# Patient Record
Sex: MALE | Race: WHITE | Employment: UNEMPLOYED | URBAN - METROPOLITAN AREA
[De-identification: names, ages, dates, MRNs, and addresses within clinical notes are randomized per-mention and may not be internally consistent; named-entity substitution may affect disease eponyms.]

---

## 2020-03-09 PROBLEM — B35.1 TINEA UNGUIUM: Status: ACTIVE | Noted: 2020-03-09

## 2020-03-09 PROBLEM — N39.0 URINARY TRACT INFECTIOUS DISEASE: Status: ACTIVE | Noted: 2020-03-09

## 2020-03-09 PROBLEM — R39.15 URINARY URGENCY: Status: ACTIVE | Noted: 2020-03-09

## 2020-03-09 PROBLEM — R58 BLEEDING: Status: ACTIVE | Noted: 2018-08-07

## 2020-03-09 PROBLEM — I10 ESSENTIAL HYPERTENSION: Status: ACTIVE | Noted: 2020-03-09

## 2020-03-09 PROBLEM — D50.9 IRON DEFICIENCY ANEMIA: Status: ACTIVE | Noted: 2020-03-09

## 2020-03-09 PROBLEM — M50.30 DEGENERATION OF CERVICAL INTERVERTEBRAL DISC: Status: ACTIVE | Noted: 2020-03-09

## 2020-03-09 PROBLEM — R97.20 RAISED PROSTATE SPECIFIC ANTIGEN: Status: ACTIVE | Noted: 2020-03-09

## 2020-03-09 PROBLEM — E78.5 HYPERLIPIDEMIA: Status: ACTIVE | Noted: 2020-03-09

## 2020-03-09 PROBLEM — R07.9 CHEST PAIN: Status: ACTIVE | Noted: 2018-07-29

## 2020-03-09 PROBLEM — A63.0 CONDYLOMA ACUMINATUM: Status: ACTIVE | Noted: 2020-03-09

## 2020-03-09 PROBLEM — N20.0 KIDNEY STONE: Status: ACTIVE | Noted: 2018-01-31

## 2020-03-09 PROBLEM — R52 BURNING PAIN: Status: ACTIVE | Noted: 2018-08-07

## 2020-03-09 PROBLEM — R31.29 MICROSCOPIC HEMATURIA: Status: ACTIVE | Noted: 2018-01-31

## 2020-03-09 PROBLEM — R33.9 RETENTION OF URINE: Status: ACTIVE | Noted: 2020-03-09

## 2020-03-09 PROBLEM — R25.1 TREMOR: Status: ACTIVE | Noted: 2020-03-09

## 2020-03-09 PROBLEM — E34.9 TESTOSTERONE DEFICIENCY: Status: ACTIVE | Noted: 2020-03-09

## 2020-03-09 PROBLEM — N40.0 BENIGN PROSTATIC HYPERPLASIA: Status: ACTIVE | Noted: 2020-03-09

## 2020-06-05 NOTE — PERIOP NOTES
PAT - SURGICAL PRE-ADMISSION INSTRUCTIONS    NAME:  Marcus French                                                          TODAY'S DATE:  6/5/2020    SURGERY DATE:  6/11/2020                                  SURGERY ARRIVAL TIME:   0900 TBV    1. Do NOT eat or drink anything, including candy or gum, after MIDNIGHT on 6/10/20 , unless you have specific instructions from your Surgeon or Anesthesia Provider to do so. 2. No smoking on the day of surgery. 3. No alcohol 24 hours prior to the day of surgery. 4. No recreational drugs for one week prior to the day of surgery. 5. Leave all valuables, including money/purse, at home. 6. Remove all jewelry, nail polish, makeup (including mascara); no lotions, powders, deodorant, or perfume/cologne/after shave. 7. Glasses/Contact lenses and Dentures may be worn to the hospital.  They will be removed prior to surgery. 8. Call your doctor if symptoms of a cold or illness develop within 24 ours prior to surgery. 9. AN ADULT MUST DRIVE YOU HOME AFTER OUTPATIENT SURGERY. 10. If you are having an OUTPATIENT procedure, please make arrangements for a responsible adult to be with you for 24 hours after your surgery. 11. If you are admitted to the hospital, you will be assigned to a bed after surgery is complete. Normally a family member will not be able to see you until you are in your assigned bed. 15. Family is encouraged to accompany you to the hospital.  We ask visitors in the treatment area to be limited to ONE person at a time to ensure patient privacy. EXCEPTIONS WILL BE MADE AS NEEDED. 15. Children under 12 are discouraged from entering the treatment area and need to be supervised by an adult when in the waiting room.     Special Instructions:  PATIENT COMING @ 1000 ON 6/8 FOR COVID TEST  Take these medications the morning of surgery with a sip of water:  AMLODIPINE, HYDRALAZINE, TAMBOCOR, PROPRANOLOL, Complete bowel prep per MD instructions., STOP anticoagulants AT LEAST 1 WEEK PRIOR to your surgery or, follow other MD instructions:  Jennifer Berman    Patient Prep:    shower with anti-bacterial soap    These surgical instructions were reviewed with PATIENT during the PAT PHONE CALL. Directions: On the morning of surgery, please go to the MAIN ENTRANCE. Sign in at the Registration Desk.     If you have any questions and/or concerns, please do not hesitate to call:  (Prior to the day of surgery)  [facility] unit:  [phone]  (Day of surgery)  [facility] unit:  [phone]

## 2020-06-08 ENCOUNTER — HOSPITAL ENCOUNTER (OUTPATIENT)
Dept: LAB | Age: 70
Discharge: HOME | End: 2020-06-08
Payer: MEDICARE

## 2020-06-08 DIAGNOSIS — Z01.818 PREOP TESTING: ICD-10-CM

## 2020-06-08 PROCEDURE — 87635 SARS-COV-2 COVID-19 AMP PRB: CPT

## 2020-06-09 LAB — SARS-COV-2, COV2NT: NOT DETECTED

## 2020-06-10 ENCOUNTER — ANESTHESIA EVENT (OUTPATIENT)
Dept: SURGERY | Age: 70
End: 2020-06-10
Payer: MEDICARE

## 2020-06-11 ENCOUNTER — HOSPITAL ENCOUNTER (OUTPATIENT)
Age: 70
Setting detail: OUTPATIENT SURGERY
Discharge: HOME | End: 2020-06-11
Attending: UROLOGY | Admitting: UROLOGY
Payer: MEDICARE

## 2020-06-11 ENCOUNTER — ANESTHESIA (OUTPATIENT)
Dept: SURGERY | Age: 70
End: 2020-06-11
Payer: MEDICARE

## 2020-06-11 VITALS
TEMPERATURE: 98 F | WEIGHT: 258.7 LBS | HEIGHT: 69 IN | DIASTOLIC BLOOD PRESSURE: 81 MMHG | BODY MASS INDEX: 38.32 KG/M2 | HEART RATE: 48 BPM | OXYGEN SATURATION: 98 % | RESPIRATION RATE: 16 BRPM | SYSTOLIC BLOOD PRESSURE: 168 MMHG

## 2020-06-11 DIAGNOSIS — N13.8 BENIGN PROSTATIC HYPERPLASIA WITH URINARY OBSTRUCTION: Primary | ICD-10-CM

## 2020-06-11 DIAGNOSIS — N40.1 BENIGN PROSTATIC HYPERPLASIA WITH URINARY OBSTRUCTION: Primary | ICD-10-CM

## 2020-06-11 PROCEDURE — 77030012884 HC SLV COMPR SCD MTEK -B: Performed by: UROLOGY

## 2020-06-11 PROCEDURE — 76210000017 HC OR PH I REC 1.5 TO 2 HR: Performed by: UROLOGY

## 2020-06-11 PROCEDURE — 76210000026 HC REC RM PH II 1 TO 1.5 HR: Performed by: UROLOGY

## 2020-06-11 PROCEDURE — 76010000172 HC OR TIME 2.5 TO 3 HR INTENSV-TIER 1: Performed by: UROLOGY

## 2020-06-11 PROCEDURE — 76060000036 HC ANESTHESIA 2.5 TO 3 HR: Performed by: UROLOGY

## 2020-06-11 PROCEDURE — 74011000272 HC RX REV CODE- 272: Performed by: UROLOGY

## 2020-06-11 PROCEDURE — 74011250637 HC RX REV CODE- 250/637: Performed by: NURSE ANESTHETIST, CERTIFIED REGISTERED

## 2020-06-11 PROCEDURE — 77030008477 HC STYL SATN SLP COVD -A: Performed by: ANESTHESIOLOGY

## 2020-06-11 PROCEDURE — 77030010547 HC BG URIN W/UMETER -A: Performed by: UROLOGY

## 2020-06-11 PROCEDURE — 77030018836 HC SOL IRR NACL ICUM -A: Performed by: UROLOGY

## 2020-06-11 PROCEDURE — 77030005546 HC CATH URETH FOL 3W BARD -A: Performed by: UROLOGY

## 2020-06-11 PROCEDURE — 74011000250 HC RX REV CODE- 250: Performed by: NURSE ANESTHETIST, CERTIFIED REGISTERED

## 2020-06-11 PROCEDURE — 74011000250 HC RX REV CODE- 250: Performed by: UROLOGY

## 2020-06-11 PROCEDURE — 77030013079 HC BLNKT BAIR HGGR 3M -A: Performed by: ANESTHESIOLOGY

## 2020-06-11 PROCEDURE — 74011250636 HC RX REV CODE- 250/636: Performed by: NURSE ANESTHETIST, CERTIFIED REGISTERED

## 2020-06-11 PROCEDURE — 77030008683 HC TU ET CUF COVD -A: Performed by: ANESTHESIOLOGY

## 2020-06-11 PROCEDURE — 77030010545: Performed by: UROLOGY

## 2020-06-11 PROCEDURE — 77030034696 HC CATH URETH FOL 2W BARD -A: Performed by: UROLOGY

## 2020-06-11 PROCEDURE — 88305 TISSUE EXAM BY PATHOLOGIST: CPT

## 2020-06-11 PROCEDURE — 77030021163 HC TUBE CYSTO IRR ICUM -A: Performed by: UROLOGY

## 2020-06-11 PROCEDURE — 77030018846 HC SOL IRR STRL H20 ICUM -A: Performed by: UROLOGY

## 2020-06-11 PROCEDURE — 74011250636 HC RX REV CODE- 250/636: Performed by: UROLOGY

## 2020-06-11 RX ORDER — GLYCOPYRROLATE 0.2 MG/ML
INJECTION INTRAMUSCULAR; INTRAVENOUS AS NEEDED
Status: DISCONTINUED | OUTPATIENT
Start: 2020-06-11 | End: 2020-06-11 | Stop reason: HOSPADM

## 2020-06-11 RX ORDER — NEOSTIGMINE METHYLSULFATE 1 MG/ML
INJECTION, SOLUTION INTRAVENOUS AS NEEDED
Status: DISCONTINUED | OUTPATIENT
Start: 2020-06-11 | End: 2020-06-11 | Stop reason: HOSPADM

## 2020-06-11 RX ORDER — ONDANSETRON 2 MG/ML
4 INJECTION INTRAMUSCULAR; INTRAVENOUS ONCE
Status: DISCONTINUED | OUTPATIENT
Start: 2020-06-11 | End: 2020-06-11 | Stop reason: HOSPADM

## 2020-06-11 RX ORDER — MIDAZOLAM HYDROCHLORIDE 1 MG/ML
INJECTION, SOLUTION INTRAMUSCULAR; INTRAVENOUS AS NEEDED
Status: DISCONTINUED | OUTPATIENT
Start: 2020-06-11 | End: 2020-06-11 | Stop reason: HOSPADM

## 2020-06-11 RX ORDER — LIDOCAINE HYDROCHLORIDE 10 MG/ML
0.1 INJECTION, SOLUTION EPIDURAL; INFILTRATION; INTRACAUDAL; PERINEURAL AS NEEDED
Status: DISCONTINUED | OUTPATIENT
Start: 2020-06-11 | End: 2020-06-11 | Stop reason: HOSPADM

## 2020-06-11 RX ORDER — FAMOTIDINE 20 MG/1
20 TABLET, FILM COATED ORAL ONCE
Status: COMPLETED | OUTPATIENT
Start: 2020-06-11 | End: 2020-06-11

## 2020-06-11 RX ORDER — OXYCODONE AND ACETAMINOPHEN 5; 325 MG/1; MG/1
1 TABLET ORAL
Qty: 12 TAB | Refills: 0 | Status: SHIPPED | OUTPATIENT
Start: 2020-06-11 | End: 2020-06-14

## 2020-06-11 RX ORDER — ONDANSETRON 2 MG/ML
INJECTION INTRAMUSCULAR; INTRAVENOUS AS NEEDED
Status: DISCONTINUED | OUTPATIENT
Start: 2020-06-11 | End: 2020-06-11 | Stop reason: HOSPADM

## 2020-06-11 RX ORDER — NALOXONE HYDROCHLORIDE 0.4 MG/ML
0.4 INJECTION, SOLUTION INTRAMUSCULAR; INTRAVENOUS; SUBCUTANEOUS AS NEEDED
Status: DISCONTINUED | OUTPATIENT
Start: 2020-06-11 | End: 2020-06-11 | Stop reason: HOSPADM

## 2020-06-11 RX ORDER — FENTANYL CITRATE 50 UG/ML
50 INJECTION, SOLUTION INTRAMUSCULAR; INTRAVENOUS AS NEEDED
Status: DISCONTINUED | OUTPATIENT
Start: 2020-06-11 | End: 2020-06-11 | Stop reason: HOSPADM

## 2020-06-11 RX ORDER — SODIUM CHLORIDE, SODIUM LACTATE, POTASSIUM CHLORIDE, CALCIUM CHLORIDE 600; 310; 30; 20 MG/100ML; MG/100ML; MG/100ML; MG/100ML
75 INJECTION, SOLUTION INTRAVENOUS CONTINUOUS
Status: DISCONTINUED | OUTPATIENT
Start: 2020-06-11 | End: 2020-06-11 | Stop reason: HOSPADM

## 2020-06-11 RX ORDER — VECURONIUM BROMIDE FOR INJECTION 1 MG/ML
INJECTION, POWDER, LYOPHILIZED, FOR SOLUTION INTRAVENOUS AS NEEDED
Status: DISCONTINUED | OUTPATIENT
Start: 2020-06-11 | End: 2020-06-11 | Stop reason: HOSPADM

## 2020-06-11 RX ORDER — DEXAMETHASONE SODIUM PHOSPHATE 4 MG/ML
INJECTION, SOLUTION INTRA-ARTICULAR; INTRALESIONAL; INTRAMUSCULAR; INTRAVENOUS; SOFT TISSUE AS NEEDED
Status: DISCONTINUED | OUTPATIENT
Start: 2020-06-11 | End: 2020-06-11 | Stop reason: HOSPADM

## 2020-06-11 RX ORDER — FENTANYL CITRATE 50 UG/ML
INJECTION, SOLUTION INTRAMUSCULAR; INTRAVENOUS AS NEEDED
Status: DISCONTINUED | OUTPATIENT
Start: 2020-06-11 | End: 2020-06-11 | Stop reason: HOSPADM

## 2020-06-11 RX ORDER — CEFAZOLIN SODIUM 2 G/50ML
2 SOLUTION INTRAVENOUS
Status: COMPLETED | OUTPATIENT
Start: 2020-06-11 | End: 2020-06-11

## 2020-06-11 RX ORDER — DOCUSATE SODIUM 100 MG/1
100 CAPSULE, LIQUID FILLED ORAL 2 TIMES DAILY
Qty: 40 CAP | Refills: 0 | Status: SHIPPED | OUTPATIENT
Start: 2020-06-11 | End: 2020-07-01

## 2020-06-11 RX ORDER — LIDOCAINE HYDROCHLORIDE 20 MG/ML
INJECTION, SOLUTION EPIDURAL; INFILTRATION; INTRACAUDAL; PERINEURAL AS NEEDED
Status: DISCONTINUED | OUTPATIENT
Start: 2020-06-11 | End: 2020-06-11 | Stop reason: HOSPADM

## 2020-06-11 RX ORDER — SUCCINYLCHOLINE CHLORIDE 100 MG/5ML
SYRINGE (ML) INTRAVENOUS AS NEEDED
Status: DISCONTINUED | OUTPATIENT
Start: 2020-06-11 | End: 2020-06-11 | Stop reason: HOSPADM

## 2020-06-11 RX ORDER — PROPOFOL 10 MG/ML
INJECTION, EMULSION INTRAVENOUS AS NEEDED
Status: DISCONTINUED | OUTPATIENT
Start: 2020-06-11 | End: 2020-06-11 | Stop reason: HOSPADM

## 2020-06-11 RX ORDER — SODIUM CHLORIDE, SODIUM LACTATE, POTASSIUM CHLORIDE, CALCIUM CHLORIDE 600; 310; 30; 20 MG/100ML; MG/100ML; MG/100ML; MG/100ML
50 INJECTION, SOLUTION INTRAVENOUS CONTINUOUS
Status: DISCONTINUED | OUTPATIENT
Start: 2020-06-11 | End: 2020-06-11 | Stop reason: HOSPADM

## 2020-06-11 RX ADMIN — MIDAZOLAM 2 MG: 1 INJECTION INTRAMUSCULAR; INTRAVENOUS at 10:01

## 2020-06-11 RX ADMIN — DEXAMETHASONE SODIUM PHOSPHATE 4 MG: 4 INJECTION, SOLUTION INTRA-ARTICULAR; INTRALESIONAL; INTRAMUSCULAR; INTRAVENOUS; SOFT TISSUE at 10:20

## 2020-06-11 RX ADMIN — VECURONIUM BROMIDE FOR INJECTION 1 MG: 1 INJECTION, POWDER, LYOPHILIZED, FOR SOLUTION INTRAVENOUS at 11:07

## 2020-06-11 RX ADMIN — VECURONIUM BROMIDE FOR INJECTION 3 MG: 1 INJECTION, POWDER, LYOPHILIZED, FOR SOLUTION INTRAVENOUS at 10:17

## 2020-06-11 RX ADMIN — SODIUM CHLORIDE, SODIUM LACTATE, POTASSIUM CHLORIDE, AND CALCIUM CHLORIDE 50 ML/HR: 600; 310; 30; 20 INJECTION, SOLUTION INTRAVENOUS at 09:01

## 2020-06-11 RX ADMIN — LIDOCAINE HYDROCHLORIDE 100 MG: 20 INJECTION, SOLUTION INTRAVENOUS at 10:07

## 2020-06-11 RX ADMIN — Medication 100 MG: at 10:07

## 2020-06-11 RX ADMIN — Medication 3 MG: at 12:30

## 2020-06-11 RX ADMIN — VECURONIUM BROMIDE FOR INJECTION 1 MG: 1 INJECTION, POWDER, LYOPHILIZED, FOR SOLUTION INTRAVENOUS at 10:07

## 2020-06-11 RX ADMIN — VECURONIUM BROMIDE FOR INJECTION 1 MG: 1 INJECTION, POWDER, LYOPHILIZED, FOR SOLUTION INTRAVENOUS at 10:45

## 2020-06-11 RX ADMIN — FENTANYL CITRATE 50 MCG: 50 INJECTION, SOLUTION INTRAMUSCULAR; INTRAVENOUS at 13:18

## 2020-06-11 RX ADMIN — VECURONIUM BROMIDE FOR INJECTION 0.5 MG: 1 INJECTION, POWDER, LYOPHILIZED, FOR SOLUTION INTRAVENOUS at 12:03

## 2020-06-11 RX ADMIN — GLYCOPYRROLATE 0.1 MG: 0.2 INJECTION INTRAMUSCULAR; INTRAVENOUS at 10:58

## 2020-06-11 RX ADMIN — VECURONIUM BROMIDE FOR INJECTION 1 MG: 1 INJECTION, POWDER, LYOPHILIZED, FOR SOLUTION INTRAVENOUS at 11:39

## 2020-06-11 RX ADMIN — GLYCOPYRROLATE 0.4 MG: 0.2 INJECTION INTRAMUSCULAR; INTRAVENOUS at 12:30

## 2020-06-11 RX ADMIN — FENTANYL CITRATE 100 MCG: 50 INJECTION, SOLUTION INTRAMUSCULAR; INTRAVENOUS at 10:07

## 2020-06-11 RX ADMIN — FAMOTIDINE 20 MG: 20 TABLET ORAL at 08:48

## 2020-06-11 RX ADMIN — PROPOFOL 200 MG: 10 INJECTION, EMULSION INTRAVENOUS at 10:07

## 2020-06-11 RX ADMIN — CEFAZOLIN 2 G: 10 INJECTION, POWDER, FOR SOLUTION INTRAVENOUS at 10:14

## 2020-06-11 RX ADMIN — ONDANSETRON 4 MG: 2 SOLUTION INTRAMUSCULAR; INTRAVENOUS at 12:25

## 2020-06-11 RX ADMIN — GLYCOPYRROLATE 0.1 MG: 0.2 INJECTION INTRAMUSCULAR; INTRAVENOUS at 10:53

## 2020-06-11 NOTE — ANESTHESIA POSTPROCEDURE EVALUATION
Procedure(s):  protouch laser enucleation of the prostate with tissue morcellation. general    Anesthesia Post Evaluation      Multimodal analgesia: multimodal analgesia not used between 6 hours prior to anesthesia start to PACU discharge  Patient location during evaluation: PACU  Patient participation: complete - patient participated  Level of consciousness: awake and alert  Pain score: 2  Pain management: adequate  Airway patency: patent  Anesthetic complications: no  Cardiovascular status: acceptable and hypertensive  Respiratory status: acceptable and room air  Hydration status: acceptable  Post anesthesia nausea and vomiting:  none  Final Post Anesthesia Temperature Assessment:  Normothermia (36.0-37.5 degrees C)      INITIAL Post-op Vital signs:   Vitals Value Taken Time   /80 6/11/2020  1:35 PM   Temp     Pulse 48 6/11/2020  1:38 PM   Resp 13 6/11/2020  1:38 PM   SpO2 94 % 6/11/2020  1:38 PM   Vitals shown include unvalidated device data.

## 2020-06-11 NOTE — ANESTHESIA PREPROCEDURE EVALUATION
Relevant Problems   No relevant active problems       Anesthetic History   No history of anesthetic complications            Review of Systems / Medical History  Patient summary reviewed, nursing notes reviewed and pertinent labs reviewed    Pulmonary        Sleep apnea: No treatment        Comments: + Hx/o SU, but resolved post bariatric surgery and weight loss in past   Neuro/Psych             Comments: + Benign Essential Tremors Cardiovascular    Hypertension        Dysrhythmias : atrial fibrillation  Hyperlipidemia  Pertinent negatives: CHF:       Comments: S/P Cardioversion for Afib in past   GI/Hepatic/Renal     GERD: well controlled      Liver disease    Comments: + BPH  + Hx/o Increased LFTs Endo/Other        Obesity, morbid obesity, arthritis and anemia     Other Findings   Comments: Medical History    Medical History Date Comments  Degeneration of cervical intervertebral disc      Iron deficiency anemia      Impotence of organic origin      Tear of medial meniscus of knee joint      Sleep apnea   RESOLVED  GERD (gastroesophageal reflux disease)      Adverse effect of anesthesia   ITCHING  Essential hypertension      Hyperlipidemia on statin therapy      Benign Essential Tremor on Mysoline and propranolol      BPH on Flomax   Followed by Zach Hearn   S/P gastric bypass 2000 4/8/2011 2000   Recurrent Paroxysmal atrial fibrillation  8/11/2009 Initial paroxysmal atrial fibrillation 2009 underwent cardioversion, maintained on daily aspirin Off coumadin 8/2010  Osteoarthritis of both knees s/p bilateral total knee replacement      Irritable bowel syndrome      Chest pain      Shortness of breath      Hypercholesteremia      Cardiac arrhythmia 08/2018 a fib (previous cardioversion 2009 successful)  Gastric bypass, h/o 8/24/2015    Edema 9/20/2007    Microscopic Hematuria-NEG  W/U 10/16/2008    Hypovitaminosis D 11/22/2012    Nonspecific abnormal results of liver function study 9/20/2007    Proteinuria 10/16/2008    SU - resolved s/p gastric bypass surgery 2000. 8/24/2015      Active Problems    Problem Noted Date  Severe obesity (BMI 35.0-35.9 with comorbidity) 05/29/2019  Osteoarthrosis left knee, s/p left total knee arthroplasty 8/24/2015, Dr. Gaffney 08/24/2015  S/P Right knee replacement Oct 14, 2013 10/14/2013  Hypovitaminosis D 11/22/2012  S/P gastric bypass 2000 04/08/2011  Overview:     2000    Tinnitus 09/24/2010  Recurrent Paroxysmal atrial fibrillation  08/11/2009  Overview:     Initial paroxysmal atrial fibrillation 2009 underwent cardioversion, maintained on daily aspirin  Off coumadin 8/2010    Impotence of organic origin 09/20/2007  Essential hypertension    Degeneration of cervical intervertebral disc    Iron deficiency anemia    Testosterone deficiency    Hyperlipidemia on statin therapy    BPH on Flomax    Overview:     Followed by Mara Welch   Benign Essential Tremor on Mysoline and propranolol    Tinea unguium      Resolved Problems    Problem Noted Date Resolved Date  Chest pain 07/29/2018 03/11/2020  Hypercoagulable state 08/27/2015 01/11/2018  Gastric bypass, h/o 08/24/2015 12/06/2018  SU - resolved s/p gastric bypass surgery 2000. 08/24/2015 12/06/2018  OA (osteoarthritis) of knee 10/14/2013 01/11/2018  Chronic anticoagulation 04/06/2010 11/02/2012  Overview:     Rec by Miya Reid, Dr Nila Pardo, as patient unaware of heart rhythm.     Proteinuria 10/16/2008 12/06/2018  Microscopic Hematuria-NEG  W/U 10/16/2008 12/06/2018  Edema 09/20/2007 12/06/2018  IRON DEFIC ANEMIA NOS 09/20/2007 11/30/2015  Nonspecific abnormal results of liver function study 09/20/2007 12/06/2018  Tear of medial meniscus of knee joint   01/11/2018           Physical Exam    Airway  Mallampati: II  TM Distance: 4 - 6 cm  Neck ROM: normal range of motion   Mouth opening: Normal     Cardiovascular    Rhythm: regular  Rate: normal         Dental      Comments: +Few missing teeth, but denies loose teeth.   Pulmonary  Breath sounds clear to auscultation               Abdominal  GI exam deferred       Other Findings            Anesthetic Plan    ASA: 3  Anesthesia type: general          Induction: Intravenous  Anesthetic plan and risks discussed with: Patient

## 2020-06-11 NOTE — PERIOP NOTES
Pre-Op Summary    Pt arrived via car with family/friend and is oriented to time, place, person and situation. Patient with steady gait with none assistive devices. Visit Vitals  /83 (BP 1 Location: Left arm, BP Patient Position: Sitting)   Pulse (!) 54   Temp 98 °F (36.7 °C)   Resp 16   Ht 5' 9\" (1.753 m)   Wt 117.3 kg (258 lb 11.2 oz)   SpO2 98%   BMI 38.20 kg/m²       Peripheral IV located on Left hand . Patients belongings are located locked in store room. Patient's point of contact is Segundo Hagen, wife and their contact number is: [phone]. They will be called for ride home. They are able to receive medication information. They will be their ride home.

## 2020-06-11 NOTE — DISCHARGE INSTRUCTIONS
Patient Education        Transurethral Resection of the Prostate (TURP): What to Expect at [facility]     Transurethral resection of the prostate (TURP) is surgery to remove prostate tissue. It is done when an overgrown prostate gland is pressing on the urethra and making it hard for a man to urinate. You may need a urinary catheter for a short time. It is a flexible plastic tube used to drain urine from your bladder when you can't urinate on your own. If it is still in place when you go home, your doctor will give you instructions on how to care for your catheter. For several days after surgery, you may feel burning when you urinate. Your urine may be pink for 1 to 3 weeks after surgery. You also may have bladder cramps, or spasms. Your doctor may give you medicine to help control the spasms. You may still feel like you need to urinate often in the weeks after your surgery. It often takes up to 6 weeks for this to get better. After you have healed, you may have less trouble urinating. You may have better control over starting and stopping your urine stream. And you may feel like you get more relief when you urinate. Most men can return to work or many of their usual tasks in 1 to 3 weeks. But for about 6 weeks, try to avoid heavy lifting and strenuous activities that might put extra pressure on your bladder. Most men still can have erections after surgery (if they were able to have them before surgery). But they may not ejaculate when they have an orgasm. Semen may go into the bladder instead of out through the penis. This is called retrograde ejaculation. It does not hurt and is not harmful to your health. This care sheet gives you a general idea about how long it will take for you to recover. But each person recovers at a different pace. Follow the steps below to get better as quickly as possible. How can you care for yourself at home? Activity  · Rest when you feel tired. · Be active.  Walking is a good choice. · Allow your body to heal. Don't move quickly or lift anything heavy until you are feeling better. · Ask your doctor when you can drive again. · Many people are able to return to work within 1 to 3 weeks after surgery. It depends on the type of work you do and how you feel. · Do not put anything in your rectum, such as an enema or suppository, for 4 to 6 weeks after the surgery. · You may shower and take baths when your doctor says it is okay. · Ask your doctor when it is okay for you to have sex. Diet  · You can eat your normal diet. If your stomach is upset, try bland, low-fat foods like plain rice, broiled chicken, toast, and yogurt. · If your bowel movements are not regular right after surgery, try to avoid constipation and straining. Drink plenty of water. Your doctor may suggest fiber, a stool softener, or a mild laxative. Medicines  · Your doctor will tell you if and when you can restart your medicines. He or she will also give you instructions about taking any new medicines. · If you take aspirin or some other blood thinner, be sure to talk to your doctor. He or she will tell you if and when to start taking those medicines again. Make sure that you understand exactly what your doctor wants you to do. · Be safe with medicines. Read and follow all instructions on the label. ? If the doctor gave you a prescription medicine for pain, take it as prescribed. ? If you are not taking a prescription pain medicine, ask your doctor if you can take an over-the-counter medicine. · Take your antibiotics as directed. Do not stop taking them just because you feel better. You need to take the full course of antibiotics. Follow-up care is a key part of your treatment and safety. Be sure to make and go to all appointments, and call your doctor if you are having problems. It's also a good idea to know your test results and keep a list of the medicines you take. When should you call for help? Call 911 anytime you think you may need emergency care. For example, call if:  · You passed out (lost consciousness). · You have chest pain, are short of breath, or cough up blood. Call your doctor now or seek immediate medical care if:  · You have pain that does not get better after you take pain medicine. · You have new or more blood clots in your urine. (It is normal for the urine to be pink for a few days.)  · You can't pass urine. · You have symptoms of a urinary tract infection. These may include:  ? Pain or burning when you urinate. ? A frequent need to urinate without being able to pass much urine. ? Pain in the flank, which is just below the rib cage and above the waist on either side of the back. ? Blood in your urine. ? A fever. · You are sick to your stomach or can't keep down fluids. · You have signs of a blood clot in your leg (called a deep vein thrombosis), such as:  ? Pain in your calf, back of the knee, thigh, or groin. ? Redness or swelling in your leg. Watch closely for changes in your health, and be sure to contact your doctor if you have problems. Where can you learn more? Go to http://ana-chrisetlle.info/  Enter P864 in the search box to learn more about \"Transurethral Resection of the Prostate (TURP): What to Expect at Home. \"  Current as of: February 11, 2020               Content Version: 12.5  © 9322-6318 [employer]. Care instructions adapted under license by [employer] (which disclaims liability or warranty for this information). If you have questions about a medical condition or this instruction, always ask your healthcare professional. [place] [address] any warranty or liability for your use of this information.        Patient Education        Learning About How to Care for a Person's Indwelling Urinary Catheter  Introduction     A urinary catheter is a flexible plastic tube used to drain urine from the bladder when a person cannot urinate. A doctor will place the catheter into the bladder by inserting it through the urethra. The urethra is the opening that carries urine from the bladder to the outside of the body. When the catheter is in the bladder, a small balloon is used to keep the catheter in place. The catheter lets urine drain from the bladder into a collection bag. Urinary catheters can be used in both men and women. A catheter that stays in place for a longer period of time is called an indwelling catheter. A catheter may be needed because of certain medical conditions. These include an enlarged prostate or problems controlling urine. It may be used after surgery on the pelvis or urinary tract. Urinary catheters are also used when the lower part of the body is paralyzed. When helping a loved one with a catheter, try to be relaxed. Caring for a catheter can be embarrassing for both of you. If you are calm and don't seem embarrassed, the person may feel more comfortable. How do you take care of the catheter? Wear disposable gloves when handling someone's catheter. Make sure to follow all of the instructions the doctor has given. And always wash your hands before and after you're done. Here are some other things to remember when caring for someone's catheter:  · Make sure that urine is running out of the catheter into the urine collection bag. And make sure that the catheter tubing does not get twisted or bent. · Keep the urine collection bag below the level of the bladder. At night it may be helpful to hang the bag on the side of the bed. · Make sure that the urine collection bag does not drag and pull on the catheter. · It is okay to shower with a catheter and urine collection bag in place, unless the doctor says not to. · Check for swelling or signs of infection in the area around the catheter. Signs of infection include pus or irritated, swollen, red, or tender skin.   · Clean the area around the catheter twice a day with soap and water. Dry with a clean towel afterward. · Do not apply powder or lotion to the skin around the catheter. · Do not tug or pull on the catheter. · Sexual intercourse may still be possible for individuals who wear a catheter. It is best to talk with a doctor about options. How do you empty the bag? The urine collection bag needs to be emptied regularly. It is best to empty the bag when it's about half full or at bedtime. If the doctor has asked you to measure the amount of urine, do that before you empty the urine into the toilet. When you are ready to empty the bag, follow these steps:  1. Put on disposable gloves. 2. Remove the drain spout from its sleeve at the bottom of the collection bag. Open the valve on the spout. 3. Let the urine flow out of the bag and into the toilet or a container. Do not let the tubing or drain spout touch anything. 4. After you empty the bag, close the valve and put the drain spout back into its sleeve. 5. Remove your gloves and throw them away. 6. Wash your hands with soap and water. How do you care for someone after the catheter is removed? After the catheter is taken out, the person may have trouble urinating. If this happens, try helping them sit in a few inches of warm water (sitz bath). If the urge to urinate comes during the sitz bath, it may be easier for them to urinate while still in the bath. Some burning may happen the first few times the person urinates. If the burning lasts longer, it may be a sign of an infection. If the catheter causes irritation or a rash, wearing loose, cotton underwear may help. Watch closely for changes in the person's health, and be sure to contact their doctor if you notice any problems. Where can you learn more? Go to http://ana-christelle.info/  Enter X535 in the search box to learn more about \"Learning About How to Care for a Person's Indwelling Urinary Catheter. \"  Current as of: December 9, 2019               Content Version: 12.5  © 2456-1063 [employer]. Care instructions adapted under license by [employer] (which disclaims liability or warranty for this information). If you have questions about a medical condition or this instruction, always ask your healthcare professional. [address] any warranty or liability for your use of this information.

## 2020-06-11 NOTE — H&P
untitled image           ASSESSMENT:                    Encounter Diagnoses                   ICD-10-CM     ICD-9-CM       1. Benign prostatic hyperplasia with urinary frequency     N40.1     600.01             R35.0     788.41       2. Elevated PSA     R97.20     790.93       3. Erectile dysfunction, unspecified erectile dysfunction type     N52.9     607.84              PLAN:       1. BPH (Prostate 153 grams on MRI)          2. Elevated PSA - Negative prostate biopsy on 4/24/17 with Dr. Seth Diaz. 3. ED          Continue Proscar 5 mg and Flomax 0.4 mg. Continue Trospium 20 mg nightly. Happy to provide refills as needed. Discussed management options including doing nothing, continuing medical therapy, and surgical intervention including laser therapy and transurethral resection/coagulative therapies. The risks and benefits of each option were discussed. Reviewed UDS consistent with COMER                Will schedule for ProLeP next available          Last PSA was 3.91 on 11/14/19 (7.82 ng/ml when corrected for Proscar). Reviewed MRI pelvis revealing - enlarged prostate gland with multiple circumscribed BPH nodules throughout the transitional zone. PI-RADS 2. Discussed risks for prostate cancer         Recommend sorting through urinary symptoms prior to proceeding with intervention for ED. Information provided. Happy to see back to discuss further intervention in future. DISCUSSION    Risks and benefits of ProTouch Laser Enucleation of the prostate were reviewed in detail including infection, bleeding, blood transfusion, injury to bladder/urethra/surrounding structures, erectile dysfunction, urinary incontinence, bladder neck contracture, persistent obstructive and irritative voiding symptoms, and global anesthesia risks including but not limited to CVA, MI, DVT, PE, pneumonia, and death. He expressed understanding and desire to proceed. There is no height or weight on file to calculate BMI. Patient's BMI is out of the normal parameters. Information about BMI was given to the patient. SUBJECTIVE:            CC:           Chief Complaint       Patient presents with            Benign Prostatic Hypertrophy                 HPI:    Sarah Cook is a 81 y.o. male  who presents today for workup and management of BPH referred to me by Sandra Syed MD.          His urinary symptoms have been worsening for many years. He c/o weak stream, urgency, frequency x 10 per day. He often needs to push and strain to void. Does not feel he fully empties his bladder. Occasional urge incontinence. His most bothersome symptom is frequency or hesitancy. Currently on Proscar 5 mg, Flomax 0.4 mg and Trospium. He also reports difficulty obtaining and maintaining erections. He has previously tried oral medications, ICI and LORELEI. He is interested in IPP. Last PSA was 3.91 ng/mL on 11/14/19. He has been on Proscar about 3 years ago. He had a negative prostate biopsy in the past. Denies flank pain, gross hematuria, dysuria, and is asymptomatic for infection today. No f/c/n/v. PVR previously 287 cc. AUA Symptom Score     1/23/2020       Over the past month how often have you had the sensation that your bladder was not completely empty after you finished urinating? 4       Over the past month, how often have had to urinate again less than 2 hours after you last finished urinating? 4       Over the past month, how often have you found you stopped and started again several times when you urinated? 2       Over the past month, how often have you found it difficult to postpone urination? 2       Over the past month, how often have you had a weak urinary stream?     2       Over the past month, how often have you had to push or strain to begin urinating?      2       Over the past month, how many times did you most typically get up to urinate from the time you went to bed at night until the time you got up in the morning?     0       AUA Score     16       If you were to spend the rest of your life with your urinary condition the way it is now, how would you feel about that? Unhappy              GEMMA     1/23/2020       How do you rate your confidence that you could get and keep an erection? 1       When you had erections with sexual stimulation, how often were your erections hard enough for penetration? 0       During sexual intercourse, how often were you able to maintain your erection after you had penetrated (entered) your partner? 0       During sexual intercourse, how easy was it to maintain your erection to completion of intercourse? 0       When you attempted sexual intercourse, how often was it satisfactory for you?     0       GEMMA Score     1                                  Past Medical History:       Diagnosis     Date            Arrhythmia                   AFIB            Benign prostatic hyperplasia (BPH) with straining on urination                  Elevated PSA                  Erectile dysfunction                   unspecified ED type            High cholesterol                  Hypertension                  Ill-defined condition                   essential tremors                    Past Surgical History:       Procedure     Laterality     Date            CARDIAC SURG PROCEDURE UNLIST                         cardioversion x 2            HX GASTRIC BYPASS                        HX HERNIA REPAIR                        HX KNEE ARTHROSCOPY                        HX ORTHOPAEDIC                         both knees surgeries                     Current Outpatient Medications       Medication     Sig     Dispense     Refill            docosahexaenoic acid/epa (FISH OIL PO)     Take  by mouth.                         vit C-E-zinc cit-lutein-zeaxan ([address]) 50 mg-15 unit- 4.5 mg-2.5 mg chew [facility]                        rivaroxaban (XARELTO) 10 mg tablet     Take  by mouth daily.      multivitamin (ONE A DAY) tablet     Take 1 Tab by mouth daily.      amLODIPine (NORVASC) 10 mg tablet     Take 10 mg by mouth daily.      atorvastatin (LIPITOR) 10 mg tablet     Take 10 mg by mouth daily.      finasteride (PROSCAR) 5 mg tablet     Take 5 mg by mouth daily.      furosemide (LASIX) 40 mg tablet     Take 40 mg by mouth daily.      hydrALAZINE (APRESOLINE) 25 mg tablet     Take 25 mg by mouth two (2) times a day.      ferrous sulfate (IRON) 325 mg (65 mg iron) tablet     Take  by mouth Daily (before breakfast).      lisinopril (PRINIVIL, ZESTRIL) 40 mg tablet     Take 40 mg by mouth daily.      primidone (MYSOLINE) 250 mg tablet     Take 250 mg by mouth four (4) times daily.      propranolol LA (INDERAL LA) 160 mg capsule     Take 160 mg by mouth daily.      tamsulosin (FLOMAX) 0.4 mg capsule     Take 0.4 mg by mouth daily.      topiramate (TOPAMAX) 50 mg tablet     Take 50 mg by mouth two (2) times a day.      acetaminophen (TYLENOL) 325 mg tablet     Take 650 mg by mouth every four (4) hours as needed for Pain.      ergocalciferol (VITAMIN D2) 50,000 unit capsule     Take 50,000 Units by mouth two (2) times a week on Wednesday and Saturday.      calcium polycarbophil (FIBER LAXATIVE, CA POLYCARBO,) 625 mg tablet     Take 625 mg by mouth two (2) times a day.                         No Known Allergies      Social History                    Socioeconomic History            Marital status:                        Spouse name:     Not on file            Number of children:     Not on file            Years of education:     Not on file            Highest education level:     Not on file       Occupational History            Not on file       Social Needs            Financial resource strain:     Not on file            Food insecurity                   Worry:     Not on file                   Inability:     Not on file            Transportation needs                   Medical:     Not on file                   Non-medical:     Not on file       Tobacco Use            Smoking status:     Never Smoker            Smokeless tobacco:     Never Used       Substance and Sexual Activity            Alcohol use: No                   Frequency:     Never            Drug use:     No            Sexual activity:     Not on file       Lifestyle            Physical activity                   Days per week:     Not on file                   Minutes per session:     Not on file            Stress:     Not on file       Relationships            Social connections                   Talks on phone:     Not on file                   Gets together:     Not on file                   Attends Alevism service:     Not on file                   Active member of club or organization:     Not on file                   Attends meetings of clubs or organizations:     Not on file                   Relationship status:     Not on file            Intimate partner violence                   Fear of current or ex partner:     Not on file                   Emotionally abused:     Not on file                   Physically abused:     Not on file                   Forced sexual activity:     Not on file       Other Topics     Concern            Not on file       Social History Narrative            Not on file            No family history on file.     Review of Systems    Constitutional: Fever: No    Skin: Rash: No    HEENT: Hearing difficulty: No    Eyes: Blurred vision: No    Cardiovascular: Chest pain: No    Respiratory: Shortness of breath: No    Gastrointestinal: Nausea/vomiting: No    Musculoskeletal: Back pain: No    Neurological: Weakness: No    Psychological: Memory loss: No    Comments/additional findings:            OBJECTIVE:       Physical Exam:     There were no vitals taken for this visit. Constitutional: Well developed, well-nourished in no acute distress. Respiratory: No respiratory distress or difficulties       Skin:  Normal color. No evidence of jaundice. Neuro/Psych:  Patient with appropriate affect. Alert and oriented. Review of Labs, Medical Images, tracings or specimens:                 Results for orders placed or performed in visit on 03/10/20       AMB POC URINALYSIS DIP STICK AUTO W/O MICRO       Result     Value     Ref Range             Color (UA POC)     Yellow                   Clarity (UA POC)     Clear                   Glucose (UA POC)     Negative     Negative             Bilirubin (UA POC)     Negative     Negative             Ketones (UA POC)     Negative     Negative             Specific gravity (UA POC)     1.030     1.001 - 1.035             Blood (UA POC)     Negative     Negative             pH (UA POC)     6.5     4.6 - 8.0             Protein (UA POC)     Negative     Negative             Urobilinogen (UA POC)     0.2 mg/dL     0.2 - 1             Nitrites (UA POC)     Negative     Negative             Leukocyte esterase (UA POC)     Negative     Negative            CT Abd/Pelv 10/5/19     Impression     Postoperative small bowel loop in the left upper quadrant is more dilated than usual, worse than before, with fluid-filled lumen. Transition zone in the same area, left upper quadrant. Developing small bowel obstruction from adhesion not excluded. Follow-up recommended. No extraluminal inflammation. No ascites or free air. Eugene Mueller MD     History and physical review. The patient has been examined. There have been no significant clinical changes.     NAD, CTAB, RRR    NA Side marked  Ancef On call to OR  Consented  Plan to proceed with Aram Maradiaga MD

## 2020-06-11 NOTE — PROCEDURES
DATE  6/11/2020     PREPROCEDURE DIAGNOSES:  1.  Bladder outlet obstruction. 2.  Benign prostatic hyperplasia. POSTPROCEDURE DIAGNOSES:  CHRIS     PROCEDURES PERFORMED:  1. Cystoscopy. 2.  ProTouch laser enucleation of the prostate with tissue morcellation. SURGEON:  Beto Owens MD     ASSISTANT:  Cherrie Swain MD ([id number])     ANESTHESIA:  General.     FLUIDS:  Crystalloid. ESTIMATED BLOOD LOSS:  Minimal.     SPECIMENS:  Prostate chips. DRAINS:  A 22-Gib[place]tarian 3-way Carney catheter. INTRAOPERATIVE FINDINGS:  1. No bladder lesions. 2.  Ureteral orifices in orthotopic position. 3.  Plus 1 trabeculation. INDICATION FOR PROCEDURE:  The patient is a 17-year-old male with a history of bladder outlet obstruction and benign prostatic hyperplasia. Risks, benefits and alternatives were explained and the patient decided to proceed. DETAILS OF THE PROCEDURE:  The patient was first identified in the holding area and taken back to the operating room. Perioperative antibiotics were given and sequential compression devices placed. Anesthesia was induced. The patient placed in dorsal lithotomy position taking care to pad all pressure points. The patient was prepped and draped in the usual sterile fashion. Timeout was performed. First, a 22-Gibraltarian rigid cystoscope was inserted into the patient's bladder. Systematic cystoscopy was performed. Next, the scope was removed and the patient's urethra was calibrated to 30-Gibraltarian with sequential [employer] sounds. Next, the 24-Gibraltarian continuous flow resectoscope was inserted into the patient's bladder. We used the visualizing obturator. This was then exchanged for the laser bridge. Using a 550 micron Diode laser fiber, we first made an incision starting at the bladder neck at 5 and 7 o'clock and taking these down to the level of the verumontanum.   We then connected these incisions in front of the verumontanum and enucleated the median lobe in a retrograde fashion. We then extended our 5 o'clock incision around the apex of the prostate on the left side. We then made a 1 o'clock incision at the bladder neck and carried this around the apex of the prostate to meet our 5 o'clock incision. We then enucleated the left lateral lobe in a retrograde fashion. Next, we extended our 7 o'clock incision around the apex of the prostate on the right side. We then made an 11 o'clock incision at the bladder neck, carrying this around the apex of the prostate and connecting it with our 7 o'clock incision. We then enucleated the right lateral lobe in a retrograde fashion. Once all 3 lobes were in the bladder, we assured hemostasis. We then exchanged the continuous flow resectoscope for the offset nephroscope with the tissue morcellator and morcellated all chips. We then reinserted the continuous flow resectoscope and used the laser to ensure hemostasis. Once this was done, the scope was removed and a 22-Indian 3-way Carney catheter was placed, 30 mL sterile water in the balloon. The case came to a conclusion.     Mickey Riley MD

## (undated) DEVICE — Device

## (undated) DEVICE — SOLUTION IRRIG 1000ML H2O STRL BLT

## (undated) DEVICE — DEVICE SECUREMENT AD W/ TRICOT ANCHR PD FOR F LTX SIL CATH

## (undated) DEVICE — BAG DRAIN URIN 2000ML LF STRL -- CONVERT TO ITEM 363123

## (undated) DEVICE — LUB SURG MEDC STRL 2OZ TUBE MC -- MEDICHOICE

## (undated) DEVICE — FOUR LEAD ARTHROSCOPIC IRRIGATION SET

## (undated) DEVICE — CATHETER URETH 22FR 30CC BLLN F 3 W SPEC M RND TIP TWO

## (undated) DEVICE — METER URIN 350ML INF CTRL MICROBICIDAL OUTLT TB EZ LOK SAMP

## (undated) DEVICE — SPONGE GZ W4XL4IN COT 12 PLY TYP VII WVN C FLD DSGN

## (undated) DEVICE — TUBING IRRIG L96IN DIA0.241IN L BOR T-U-R W/ NVENT PIERCING

## (undated) DEVICE — BAG DRNGE NONSTERILE W/ SUCT HOSE CYSTO/UROLOGICAL FOR GE

## (undated) DEVICE — CATHETER URETH PED 22FR BLLN 3CC 2 W F INF CTRL BARDX

## (undated) DEVICE — SOLUTION SCRB 4OZ 10% PVP I POVIDONE IOD TOP PAINT EXIDINE

## (undated) DEVICE — CONTAINER DRN 20L DISP FLUIDRN LLS

## (undated) DEVICE — SOLUTION IRRIG 3000ML 0.9% SOD CHL FLX CONT 0797208] ICU MEDICAL INC]

## (undated) DEVICE — SOLUTION IV 1000ML 0.9% SOD CHL

## (undated) DEVICE — STERILE LATEX POWDER-FREE SURGICAL GLOVESWITH NITRILE COATING: Brand: PROTEXIS

## (undated) DEVICE — SYRINGE,TOOMEY,IRRIGATION,70CC,STERILE: Brand: MEDLINE

## (undated) DEVICE — SLEEVE COMPR L THGH LEN WARP